# Patient Record
Sex: MALE | Race: WHITE | NOT HISPANIC OR LATINO | Employment: FULL TIME | RURAL
[De-identification: names, ages, dates, MRNs, and addresses within clinical notes are randomized per-mention and may not be internally consistent; named-entity substitution may affect disease eponyms.]

---

## 2019-08-30 ENCOUNTER — OFFICE VISIT (OUTPATIENT)
Dept: FAMILY MEDICINE CLINIC | Facility: CLINIC | Age: 49
End: 2019-08-30

## 2019-08-30 VITALS
OXYGEN SATURATION: 98 % | SYSTOLIC BLOOD PRESSURE: 143 MMHG | HEART RATE: 83 BPM | RESPIRATION RATE: 18 BRPM | WEIGHT: 251 LBS | TEMPERATURE: 98 F | DIASTOLIC BLOOD PRESSURE: 84 MMHG | BODY MASS INDEX: 34 KG/M2 | HEIGHT: 72 IN

## 2019-08-30 DIAGNOSIS — E11.9 TYPE 2 DIABETES MELLITUS WITHOUT COMPLICATION, WITHOUT LONG-TERM CURRENT USE OF INSULIN (HCC): Primary | ICD-10-CM

## 2019-08-30 DIAGNOSIS — Z13.220 SCREENING FOR HYPERLIPIDEMIA: ICD-10-CM

## 2019-08-30 DIAGNOSIS — Z13.29 SCREENING FOR THYROID DISORDER: ICD-10-CM

## 2019-08-30 DIAGNOSIS — R73.9 ELEVATED BLOOD SUGAR LEVEL: ICD-10-CM

## 2019-08-30 DIAGNOSIS — R73.9 ELEVATED BLOOD SUGAR: ICD-10-CM

## 2019-08-30 PROBLEM — R03.0 ELEVATED BLOOD PRESSURE READING: Status: ACTIVE | Noted: 2019-08-30

## 2019-08-30 LAB
BILIRUB BLD-MCNC: NEGATIVE MG/DL
CLARITY, POC: CLEAR
COLOR UR: YELLOW
GLUCOSE BLDC GLUCOMTR-MCNC: 329 MG/DL (ref 70–130)
GLUCOSE UR STRIP-MCNC: ABNORMAL MG/DL
HBA1C MFR BLD: 9 %
KETONES UR QL: NEGATIVE
LEUKOCYTE EST, POC: NEGATIVE
NITRITE UR-MCNC: NEGATIVE MG/ML
PH UR: 5 [PH] (ref 5–8)
POC MICROALBUMIN URINE: 20
PROT UR STRIP-MCNC: ABNORMAL MG/DL
RBC # UR STRIP: NEGATIVE /UL
SP GR UR: 1.03 (ref 1–1.03)
UROBILINOGEN UR QL: NORMAL

## 2019-08-30 PROCEDURE — 81003 URINALYSIS AUTO W/O SCOPE: CPT | Performed by: FAMILY MEDICINE

## 2019-08-30 PROCEDURE — 82962 GLUCOSE BLOOD TEST: CPT | Performed by: FAMILY MEDICINE

## 2019-08-30 PROCEDURE — 83036 HEMOGLOBIN GLYCOSYLATED A1C: CPT | Performed by: FAMILY MEDICINE

## 2019-08-30 PROCEDURE — 99213 OFFICE O/P EST LOW 20 MIN: CPT | Performed by: FAMILY MEDICINE

## 2019-08-30 PROCEDURE — 82044 UR ALBUMIN SEMIQUANTITATIVE: CPT | Performed by: FAMILY MEDICINE

## 2019-08-30 RX ORDER — LANCETS 33 GAUGE
EACH MISCELLANEOUS
Status: CANCELLED | OUTPATIENT
Start: 2019-08-30

## 2019-08-30 RX ORDER — GLYBURIDE 2.5 MG/1
2.5 TABLET ORAL
Qty: 30 TABLET | Refills: 12 | Status: SHIPPED | OUTPATIENT
Start: 2019-08-30 | End: 2020-12-16

## 2019-08-30 RX ORDER — BLOOD-GLUCOSE METER
EACH MISCELLANEOUS
Status: CANCELLED | OUTPATIENT
Start: 2019-08-30

## 2019-08-30 RX ORDER — LANCETS 30 GAUGE
1 EACH MISCELLANEOUS DAILY
Qty: 90 EACH | Refills: 0 | Status: SHIPPED | OUTPATIENT
Start: 2019-08-30

## 2019-08-30 RX ORDER — BLOOD-GLUCOSE METER
1 KIT MISCELLANEOUS DAILY
Qty: 1 EACH | Refills: 0 | Status: SHIPPED | OUTPATIENT
Start: 2019-08-30

## 2019-09-03 ENCOUNTER — RESULTS ENCOUNTER (OUTPATIENT)
Dept: FAMILY MEDICINE CLINIC | Facility: CLINIC | Age: 49
End: 2019-09-03

## 2019-09-03 DIAGNOSIS — Z13.29 SCREENING FOR THYROID DISORDER: ICD-10-CM

## 2019-09-03 DIAGNOSIS — E11.9 TYPE 2 DIABETES MELLITUS WITHOUT COMPLICATION, WITHOUT LONG-TERM CURRENT USE OF INSULIN (HCC): ICD-10-CM

## 2019-09-03 DIAGNOSIS — Z13.220 SCREENING FOR HYPERLIPIDEMIA: ICD-10-CM

## 2019-09-06 ENCOUNTER — TELEPHONE (OUTPATIENT)
Dept: FAMILY MEDICINE CLINIC | Facility: CLINIC | Age: 49
End: 2019-09-06

## 2019-09-07 LAB
ALBUMIN SERPL-MCNC: 4.9 G/DL (ref 3.5–5.5)
ALBUMIN/GLOB SERPL: 2.3 {RATIO} (ref 1.2–2.2)
ALP SERPL-CCNC: 75 IU/L (ref 39–117)
ALT SERPL-CCNC: 18 IU/L (ref 0–44)
AST SERPL-CCNC: 17 IU/L (ref 0–40)
BASOPHILS # BLD AUTO: 0 X10E3/UL (ref 0–0.2)
BASOPHILS NFR BLD AUTO: 0 %
BILIRUB SERPL-MCNC: 0.4 MG/DL (ref 0–1.2)
BUN SERPL-MCNC: 22 MG/DL (ref 6–24)
BUN/CREAT SERPL: 24 (ref 9–20)
CALCIUM SERPL-MCNC: 9.7 MG/DL (ref 8.7–10.2)
CHLORIDE SERPL-SCNC: 103 MMOL/L (ref 96–106)
CHOLEST SERPL-MCNC: 245 MG/DL (ref 100–199)
CHOLEST/HDLC SERPL: 6.8 RATIO (ref 0–5)
CO2 SERPL-SCNC: 23 MMOL/L (ref 20–29)
CREAT SERPL-MCNC: 0.92 MG/DL (ref 0.76–1.27)
EOSINOPHIL # BLD AUTO: 0.1 X10E3/UL (ref 0–0.4)
EOSINOPHIL NFR BLD AUTO: 1 %
ERYTHROCYTE [DISTWIDTH] IN BLOOD BY AUTOMATED COUNT: 12.8 % (ref 12.3–15.4)
GLOBULIN SER CALC-MCNC: 2.1 G/DL (ref 1.5–4.5)
GLUCOSE SERPL-MCNC: 132 MG/DL (ref 65–99)
HCT VFR BLD AUTO: 45.8 % (ref 37.5–51)
HDLC SERPL-MCNC: 36 MG/DL
HGB BLD-MCNC: 15.6 G/DL (ref 13–17.7)
IMM GRANULOCYTES # BLD AUTO: 0 X10E3/UL (ref 0–0.1)
IMM GRANULOCYTES NFR BLD AUTO: 0 %
LDLC SERPL CALC-MCNC: 182 MG/DL (ref 0–99)
LYMPHOCYTES # BLD AUTO: 2.5 X10E3/UL (ref 0.7–3.1)
LYMPHOCYTES NFR BLD AUTO: 47 %
MCH RBC QN AUTO: 30.1 PG (ref 26.6–33)
MCHC RBC AUTO-ENTMCNC: 34.1 G/DL (ref 31.5–35.7)
MCV RBC AUTO: 88 FL (ref 79–97)
MONOCYTES # BLD AUTO: 0.5 X10E3/UL (ref 0.1–0.9)
MONOCYTES NFR BLD AUTO: 8 %
NEUTROPHILS # BLD AUTO: 2.4 X10E3/UL (ref 1.4–7)
NEUTROPHILS NFR BLD AUTO: 44 %
PLATELET # BLD AUTO: 262 X10E3/UL (ref 150–450)
POTASSIUM SERPL-SCNC: 4.9 MMOL/L (ref 3.5–5.2)
PROT SERPL-MCNC: 7 G/DL (ref 6–8.5)
RBC # BLD AUTO: 5.18 X10E6/UL (ref 4.14–5.8)
SODIUM SERPL-SCNC: 141 MMOL/L (ref 134–144)
TRIGL SERPL-MCNC: 133 MG/DL (ref 0–149)
TSH SERPL DL<=0.005 MIU/L-ACNC: 1.2 UIU/ML (ref 0.45–4.5)
VLDLC SERPL CALC-MCNC: 27 MG/DL (ref 5–40)
WBC # BLD AUTO: 5.4 X10E3/UL (ref 3.4–10.8)

## 2019-12-02 ENCOUNTER — OFFICE VISIT (OUTPATIENT)
Dept: FAMILY MEDICINE CLINIC | Facility: CLINIC | Age: 49
End: 2019-12-02

## 2019-12-02 VITALS
TEMPERATURE: 98 F | OXYGEN SATURATION: 98 % | DIASTOLIC BLOOD PRESSURE: 79 MMHG | WEIGHT: 237 LBS | HEART RATE: 94 BPM | SYSTOLIC BLOOD PRESSURE: 117 MMHG | HEIGHT: 72 IN | RESPIRATION RATE: 18 BRPM | BODY MASS INDEX: 32.1 KG/M2

## 2019-12-02 DIAGNOSIS — E11.9 TYPE 2 DIABETES MELLITUS WITHOUT COMPLICATION, WITHOUT LONG-TERM CURRENT USE OF INSULIN (HCC): Primary | ICD-10-CM

## 2019-12-02 LAB — HBA1C MFR BLD: 5.4 %

## 2019-12-02 PROCEDURE — 99213 OFFICE O/P EST LOW 20 MIN: CPT | Performed by: FAMILY MEDICINE

## 2019-12-02 PROCEDURE — 83036 HEMOGLOBIN GLYCOSYLATED A1C: CPT | Performed by: FAMILY MEDICINE

## 2019-12-02 NOTE — PROGRESS NOTES
"No chief complaint on file.      History of Present Illness:  Subjective   Devendra Ramírez is a 49 y.o. male.   History of Present Illness     Subjective:      Devendra Ramírez is a 49 y.o. male who presents for follow-up of Type 2 diabetes mellitus.  Current symptoms/problems include none and have been improving. Symptoms have been present for 6 months.    Known diabetic complications: none  Cardiovascular risk factors: diabetes mellitus, male gender, obesity (BMI >= 30 kg/m2) and sedentary lifestyle  Current diabetic medications include oral agents (dual therapy): Metformin & Glyburide and insulin injections: Ozempic.   Eye exam current (within one year): yes  Weight trend: decreasing steadily  Prior visit with dietician: no  Current diet: in general, a \"healthy\" diet  , well balanced  Current exercise: cardiovascular workout on exercise equipment and weightlifting    Current monitoring regimen: home blood tests - 3 times weekly  Home blood sugar records: fasting range: < 120  Any episodes of hypoglycemia? no    Is He on ACE inhibitor or angiotensin II receptor blocker?   No   none  none    The following portions of the patient's history were reviewed and updated as appropriate: allergies, current medications, past family history, past medical history, past social history, past surgical history and problem list.    Allergies:  No Known Allergies    Social History:  Social History     Socioeconomic History   • Marital status: Single     Spouse name: Not on file   • Number of children: Not on file   • Years of education: Not on file   • Highest education level: Not on file   Tobacco Use   • Smoking status: Never Smoker   • Smokeless tobacco: Current User     Types: Chew   Substance and Sexual Activity   • Alcohol use: Yes     Frequency: Monthly or less     Comment: 3 drinks a month   • Drug use: No       Family History:  Family History   Problem Relation Age of Onset   • Diabetes Father        Past Medical History " ":  Active Ambulatory Problems     Diagnosis Date Noted   • Type 2 diabetes mellitus without complication, without long-term current use of insulin (CMS/[facility]) 08/30/2019   • Elevated blood pressure reading 08/30/2019     Resolved Ambulatory Problems     Diagnosis Date Noted   • No Resolved Ambulatory Problems     Past Medical History:   Diagnosis Date   • Acute maxillary sinusitis    • Blurry vision, bilateral    • Cheilitis    • Fever and chills    • Folliculitis barbae    • Numbness, limb    • Overweight (BMI 25.0-29.9)    • Recurrent cold sores    • Screening for diabetes mellitus    • Screening for hyperlipidemia    • Screening for hypothyroidism    • Smokeless tobacco use          Current Outpatient Medications:   •  glucose blood test strip, Use as instructed, Disp: 90 each, Rfl: 0  •  glucose monitor monitoring kit, 1 each Daily. Use to check blood sugar 1 time a day, Disp: 1 each, Rfl: 0  •  glyBURIDE (DIAbeta) 2.5 MG tablet, Take 1 tablet by mouth Daily With Breakfast., Disp: 30 tablet, Rfl: 12  •  Lancets misc, 1 Device Daily., Disp: 90 each, Rfl: 0  •  metFORMIN (GLUCOPHAGE) 500 MG tablet, Take 1 tablet by mouth 2 (Two) Times a Day With Meals., Disp: 60 tablet, Rfl: 12  •  Semaglutide (OZEMPIC) 0.25 or 0.5 MG/DOSE solution pen-injector, Inject 0.5 mg under the skin into the appropriate area as directed 1 (One) Time Per Week., Disp: 1 pen, Rfl: 12    Review of Systems  Pertinent items are noted in HPI.      Objective:      Resp 18   Ht 182.9 cm (72\")   Wt 108 kg (237 lb)   BMI 32.14 kg/m²     General:  alert, appears stated age, cooperative and no distress   Oropharynx: lips, mucosa, and tongue normal; teeth and gums normal    Eyes:  conjunctivae/corneas clear. PERRL, EOM's intact. Fundi benign.    Ears:  normal TM's and external ear canals both ears   Neck: no adenopathy, no carotid bruit, no JVD, supple, symmetrical, trachea midline and thyroid not enlarged, symmetric, no tenderness/mass/nodules "   Thyroid:  no palpable nodule   Lung: clear to auscultation bilaterally   Heart:  regular rate and rhythm, S1, S2 normal, no murmur, click, rub or gallop   Abdomen: soft, non-tender; bowel sounds normal; no masses,  no organomegaly   Extremities: extremities normal, atraumatic, no cyanosis or edema and NA   Skin: warm and dry, no hyperpigmentation, vitiligo, or suspicious lesions   Pulses: 2+ and symmetric   Neuro: normal without focal findings, mental status, speech normal, alert and oriented x3 and FRANCISCO JAVIER     Lab Review  Total CO2 (mmol/L)   Date Value   09/06/2019 23     BUN (mg/dL)   Date Value   09/06/2019 22     Creatinine (mg/dL)   Date Value   09/06/2019 0.92     A1c is 5.4      Assessment:      Diabetes Mellitus type II, under excellent control.      Plan:      1.  Rx changes: none  2.  Education: Reviewed ‘ABCs’ of diabetes management (respective goals in parentheses):  A1C (<7), blood pressure (<130/80), and cholesterol (LDL <100).  3.  Compliance at present is estimated to be excellent. Efforts to improve compliance (if necessary) will be directed at regular blood sugar monitoring: quarterly.  4. Follow up: 3 months   Allergies:  No Known Allergies    Social History:  Social History     Socioeconomic History   • Marital status: Single     Spouse name: Not on file   • Number of children: Not on file   • Years of education: Not on file   • Highest education level: Not on file   Tobacco Use   • Smoking status: Never Smoker   • Smokeless tobacco: Current User     Types: Chew   Substance and Sexual Activity   • Alcohol use: Yes     Frequency: Monthly or less     Comment: 3 drinks a month   • Drug use: No       Family History:  Family History   Problem Relation Age of Onset   • Diabetes Father        Past Medical History :  Active Ambulatory Problems     Diagnosis Date Noted   • Type 2 diabetes mellitus without complication, without long-term current use of insulin (CMS/[facility]) 08/30/2019   • Elevated blood  pressure reading 08/30/2019     Resolved Ambulatory Problems     Diagnosis Date Noted   • No Resolved Ambulatory Problems     Past Medical History:   Diagnosis Date   • Acute maxillary sinusitis    • Blurry vision, bilateral    • Cheilitis    • Fever and chills    • Folliculitis barbae    • Numbness, limb    • Overweight (BMI 25.0-29.9)    • Recurrent cold sores    • Screening for diabetes mellitus    • Screening for hyperlipidemia    • Screening for hypothyroidism    • Smokeless tobacco use        Medication List:    Current Outpatient Medications:   •  glucose blood test strip, Use as instructed, Disp: 90 each, Rfl: 0  •  glucose monitor monitoring kit, 1 each Daily. Use to check blood sugar 1 time a day, Disp: 1 each, Rfl: 0  •  glyBURIDE (DIAbeta) 2.5 MG tablet, Take 1 tablet by mouth Daily With Breakfast., Disp: 30 tablet, Rfl: 12  •  Lancets misc, 1 Device Daily., Disp: 90 each, Rfl: 0  •  metFORMIN (GLUCOPHAGE) 500 MG tablet, Take 1 tablet by mouth 2 (Two) Times a Day With Meals., Disp: 60 tablet, Rfl: 12  •  Semaglutide (OZEMPIC) 0.25 or 0.5 MG/DOSE solution pen-injector, Inject 0.5 mg under the skin into the appropriate area as directed 1 (One) Time Per Week., Disp: 1 pen, Rfl: 12    Past Surgical History:  No past surgical history on file.     The following portions of the patient's history were reviewed and updated as appropriate: allergies, current medications, past family history, past medical history, past social history, past surgical history and problem list.    Review Of Systems:  Review of Systems    Physical Exam:  Vital Signs:  There were no vitals filed for this visit.  There is no height or weight on file to calculate BMI.    Physical Exam      Assessment and Plan:  There are no diagnoses linked to this encounter.

## 2019-12-28 NOTE — ASSESSMENT & PLAN NOTE
Diabetes is improving with treatment.   Continue current treatment regimen.  Reminded to bring in blood sugar diary at next visit.  Dietary recommendations for ADA diet.  Regular aerobic exercise.  Discussed ways to avoid symptomatic hypoglycemia.  Discussed sick day management.  Diabetes will be reassessed in 3 months.  His A1C is 5.4

## 2020-03-27 PROBLEM — F17.200 TOBACCO USE DISORDER: Status: ACTIVE | Noted: 2020-03-27

## 2020-03-27 PROBLEM — E66.3 OVERWEIGHT: Status: ACTIVE | Noted: 2020-03-27

## 2020-03-27 PROBLEM — H53.8 BLURRY VISION, BILATERAL: Status: ACTIVE | Noted: 2020-03-27

## 2020-03-27 PROBLEM — K13.0 CHEILITIS: Status: ACTIVE | Noted: 2020-03-27

## 2020-10-26 PROCEDURE — U0003 INFECTIOUS AGENT DETECTION BY NUCLEIC ACID (DNA OR RNA); SEVERE ACUTE RESPIRATORY SYNDROME CORONAVIRUS 2 (SARS-COV-2) (CORONAVIRUS DISEASE [COVID-19]), AMPLIFIED PROBE TECHNIQUE, MAKING USE OF HIGH THROUGHPUT TECHNOLOGIES AS DESCRIBED BY CMS-2020-01-R: HCPCS | Performed by: FAMILY MEDICINE

## 2020-10-27 ENCOUNTER — TELEPHONE (OUTPATIENT)
Dept: URGENT CARE | Facility: CLINIC | Age: 50
End: 2020-10-27

## 2020-10-27 NOTE — TELEPHONE ENCOUNTER
Call this patient and told him his coronavirus test was positive.  Told him that he would need to stay on home quarantine for 10 days from the day his symptoms started.  He was instructed to stay 6 feet away from others in his home.  He was instructed to wear a mask if he is in the same room with others even if he is 6 feet away.  He is to keep his hands washed and away from his face.  Told to go to the emergency room if he develops respiratory distress.

## 2020-12-16 ENCOUNTER — OFFICE VISIT (OUTPATIENT)
Dept: FAMILY MEDICINE CLINIC | Facility: CLINIC | Age: 50
End: 2020-12-16

## 2020-12-16 VITALS
HEIGHT: 71 IN | WEIGHT: 243.6 LBS | SYSTOLIC BLOOD PRESSURE: 144 MMHG | BODY MASS INDEX: 34.1 KG/M2 | HEART RATE: 86 BPM | TEMPERATURE: 98.2 F | RESPIRATION RATE: 18 BRPM | OXYGEN SATURATION: 97 % | DIASTOLIC BLOOD PRESSURE: 85 MMHG

## 2020-12-16 DIAGNOSIS — Z00.00 PHYSICAL EXAM: Primary | ICD-10-CM

## 2020-12-16 DIAGNOSIS — E11.9 TYPE 2 DIABETES MELLITUS WITHOUT COMPLICATION, WITHOUT LONG-TERM CURRENT USE OF INSULIN (HCC): ICD-10-CM

## 2020-12-16 DIAGNOSIS — I10 ESSENTIAL HYPERTENSION: ICD-10-CM

## 2020-12-16 DIAGNOSIS — N52.9 ERECTILE DYSFUNCTION, UNSPECIFIED ERECTILE DYSFUNCTION TYPE: ICD-10-CM

## 2020-12-16 DIAGNOSIS — E78.2 MIXED HYPERLIPIDEMIA: ICD-10-CM

## 2020-12-16 LAB
GLUCOSE BLDC GLUCOMTR-MCNC: 175 MG/DL (ref 70–130)
HBA1C MFR BLD: 8.3 %

## 2020-12-16 PROCEDURE — 99214 OFFICE O/P EST MOD 30 MIN: CPT | Performed by: FAMILY MEDICINE

## 2020-12-16 PROCEDURE — 83036 HEMOGLOBIN GLYCOSYLATED A1C: CPT | Performed by: FAMILY MEDICINE

## 2020-12-16 PROCEDURE — 99396 PREV VISIT EST AGE 40-64: CPT | Performed by: FAMILY MEDICINE

## 2020-12-16 PROCEDURE — 82962 GLUCOSE BLOOD TEST: CPT | Performed by: FAMILY MEDICINE

## 2020-12-16 RX ORDER — GLYBURIDE 5 MG/1
5 TABLET ORAL 2 TIMES DAILY WITH MEALS
Qty: 180 TABLET | Refills: 3 | Status: SHIPPED | OUTPATIENT
Start: 2020-12-16 | End: 2021-07-08 | Stop reason: SDUPTHER

## 2020-12-16 RX ORDER — SILDENAFIL CITRATE 20 MG/1
20 TABLET ORAL 3 TIMES DAILY
Qty: 90 TABLET | Refills: 2 | Status: SHIPPED | OUTPATIENT
Start: 2020-12-16 | End: 2022-01-14 | Stop reason: SDUPTHER

## 2020-12-16 RX ORDER — LISINOPRIL 20 MG/1
20 TABLET ORAL DAILY
Qty: 90 TABLET | Refills: 3 | Status: SHIPPED | OUTPATIENT
Start: 2020-12-16 | End: 2022-01-14

## 2020-12-16 RX ORDER — PRAVASTATIN SODIUM 40 MG
40 TABLET ORAL DAILY
Qty: 30 TABLET | Refills: 12 | Status: SHIPPED | OUTPATIENT
Start: 2020-12-16 | End: 2022-01-14 | Stop reason: SDUPTHER

## 2020-12-16 NOTE — ASSESSMENT & PLAN NOTE
Hypertension is worsening.  Continue current treatment regimen.  Dietary sodium restriction.  Weight loss.  Regular aerobic exercise.  Blood pressure will be reassessed in 4 weeks.

## 2020-12-16 NOTE — PROGRESS NOTES
Chief Complaint   Patient presents with   • Diabetes   • Annual Exam       History of Present Illness:  Subjective   Devendra Ramírez is a 50 y.o. male.   12/16/2020- The patient is here today and he states that since him getting the virus it in October he has hit he states that he has had a hard time getting hard and having an erection. He states that this was never a concern for him prior to the virus and he states that now he has a issues with it and he is concerned as to what is causing this.     Diabetes  He presents for his follow-up diabetic visit. He has type 2 diabetes mellitus. Hypoglycemia symptoms include nervousness/anxiousness (Due to stress of virus and work is harder ). Pertinent negatives for hypoglycemia include no confusion, dizziness, headaches, hunger, mood changes, pallor, seizures, sleepiness, speech difficulty, sweats or tremors. Associated symptoms include blurred vision (He states that he has reading glasses now ). Pertinent negatives for diabetes include no chest pain, no fatigue, no foot paresthesias, no foot ulcerations, no polydipsia, no polyphagia, no polyuria, no visual change, no weakness and no weight loss. There are no hypoglycemic complications. Pertinent negatives for hypoglycemia complications include no blackouts, no hospitalization, no nocturnal hypoglycemia, no required assistance and no required glucagon injection. Symptoms are stable. There are no diabetic complications. Pertinent negatives for diabetic complications include no autonomic neuropathy, CVA, heart disease, impotence, nephropathy, peripheral neuropathy, PVD or retinopathy. Risk factors for coronary artery disease include family history, diabetes mellitus, obesity, male sex, hypertension and stress. Current diabetic treatment includes oral agent (monotherapy) (he is one metformin to treat ). His weight is fluctuating minimally. He is following a diabetic (portion control as well and calories and intake ) diet.  When asked about meal planning, he reported none. He has not had a previous visit with a dietitian. He participates in exercise intermittently. (Patient states that he checks his sugars twice a day at home and he states that he is averaging 120. He states that depending on what he eats this could change but he states that overall he stays pretty consistent.   ) An ACE inhibitor/angiotensin II receptor blocker is not being taken. He does not see a podiatrist.Eye exam is current.      Devendra Ramírez is a 50 y.o. male here for his annual physical with me. Devendra is here for coordination of medical care, to discuss health maintenance, disease prevention as well as to followup on medical problems. Patient has been followed by me for some time now . Patient's last CPE was unknown. Activity level is moderate. Exercises 4 per week. Appetite is good. Feels well with few complaints. Energy level is good. Sleeps well. Patient is doing routine self skin exam monthly.       Allergies:  No Known Allergies    Social History:  Social History     Socioeconomic History   • Marital status: Single     Spouse name: Not on file   • Number of children: Not on file   • Years of education: Not on file   • Highest education level: Not on file   Tobacco Use   • Smoking status: Never Smoker   • Smokeless tobacco: Current User     Types: Chew   Substance and Sexual Activity   • Alcohol use: Yes     Frequency: Monthly or less     Comment: 3 drinks a month   • Drug use: No       Family History:  Family History   Problem Relation Age of Onset   • Diabetes Father        Past Medical History :  Active Ambulatory Problems     Diagnosis Date Noted   • Type 2 diabetes mellitus without complication, without long-term current use of insulin (CMS/[facility]) 08/30/2019   • Elevated blood pressure reading 08/30/2019   • Blurry vision, bilateral 03/27/2020   • Cheilitis 03/27/2020   • Overweight 03/27/2020   • Tobacco use disorder 03/27/2020   • Physical exam  12/16/2020   • Essential hypertension 12/16/2020   • Mixed hyperlipidemia 12/16/2020   • Erectile dysfunction 12/16/2020     Resolved Ambulatory Problems     Diagnosis Date Noted   • No Resolved Ambulatory Problems     Past Medical History:   Diagnosis Date   • Acute maxillary sinusitis    • Fever and chills    • Folliculitis barbae    • Numbness, limb    • Overweight (BMI 25.0-29.9)    • Recurrent cold sores    • Screening for diabetes mellitus    • Screening for hyperlipidemia    • Screening for hypothyroidism    • Smokeless tobacco use        Medication List:  Outpatient Encounter Medications as of 12/16/2020   Medication Sig Dispense Refill   • glucose blood test strip Use as instructed 90 each 0   • glucose monitor monitoring kit 1 each Daily. Use to check blood sugar 1 time a day 1 each 0   • Lancets misc 1 Device Daily. 90 each 0   • metFORMIN (GLUCOPHAGE) 500 MG tablet Take 1 tablet by mouth 2 (Two) Times a Day With Meals. 60 tablet 12   • [DISCONTINUED] benzonatate (TESSALON) 200 MG capsule 1 capsule p.o. every 8 hours as needed cough 21 capsule 0   • glyburide (DIAbeta) 5 MG tablet Take 1 tablet by mouth 2 (Two) Times a Day With Meals. 180 tablet 3   • lisinopril (PRINIVIL,ZESTRIL) 20 MG tablet Take 1 tablet by mouth Daily. 90 tablet 3   • pravastatin (PRAVACHOL) 40 MG tablet Take 1 tablet by mouth Daily. 30 tablet 12   • sildenafil (REVATIO) 20 MG tablet Take 1 tablet by mouth 3 (Three) Times a Day. 90 tablet 2   • [DISCONTINUED] glyBURIDE (DIAbeta) 2.5 MG tablet Take 1 tablet by mouth Daily With Breakfast. 30 tablet 12   • [DISCONTINUED] Semaglutide (OZEMPIC) 0.25 or 0.5 MG/DOSE solution pen-injector Inject 0.5 mg under the skin into the appropriate area as directed 1 (One) Time Per Week. 1 pen 12     No facility-administered encounter medications on file as of 12/16/2020.        Past Surgical History:  History reviewed. No pertinent surgical history.     The following portions of the patient's history  "were reviewed and updated as appropriate: allergies, current medications, past family history, past medical history, past social history, past surgical history and problem list.    Review Of Systems:  Review of Systems   Constitutional: Negative for activity change, appetite change, fatigue and unexpected weight loss.   HENT: Negative for congestion, postnasal drip, sinus pressure and sore throat.    Eyes: Positive for blurred vision (He states that he has reading glasses now ). Negative for itching.   Respiratory: Negative for cough, shortness of breath and wheezing.    Cardiovascular: Negative for chest pain.   Gastrointestinal: Negative for abdominal pain, constipation, nausea, vomiting and GERD.   Endocrine: Negative for cold intolerance, heat intolerance, polydipsia, polyphagia and polyuria.   Genitourinary: Negative for difficulty urinating, dysuria, impotence and urinary incontinence.   Musculoskeletal: Negative for back pain, joint swelling and neck pain.   Skin: Negative for color change, pallor and rash.   Neurological: Negative for dizziness, tremors, seizures, speech difficulty, weakness, memory problem and confusion.   Psychiatric/Behavioral: Negative for behavioral problems, decreased concentration, suicidal ideas and depressed mood. The patient is nervous/anxious (Due to stress of virus and work is harder ).        Objective     Physical Exam:  Vital Signs:  Visit Vitals  /85   Pulse 86   Temp 98.2 °F (36.8 °C)   Resp 18   Ht 180.3 cm (71\")   Wt 110 kg (243 lb 9.6 oz)   SpO2 97%   BMI 33.98 kg/m²       Physical Exam  Vitals signs reviewed.   Constitutional:       Appearance: He is well-developed.   HENT:      Head: Normocephalic and atraumatic.      Nose: Nose normal.   Eyes:      General: Lids are normal.      Conjunctiva/sclera: Conjunctivae normal.      Pupils: Pupils are equal, round, and reactive to light.   Neck:      Musculoskeletal: Normal range of motion and neck supple. "   Cardiovascular:      Rate and Rhythm: Normal rate and regular rhythm.      Pulses: Normal pulses.      Heart sounds: Normal heart sounds.   Pulmonary:      Effort: Pulmonary effort is normal. No respiratory distress.      Breath sounds: Normal breath sounds.   Abdominal:      General: Bowel sounds are normal.      Palpations: Abdomen is soft.   Musculoskeletal: Normal range of motion.   Skin:     General: Skin is warm and dry.      Capillary Refill: Capillary refill takes less than 2 seconds.   Neurological:      Mental Status: He is alert and oriented to person, place, and time.   Psychiatric:         Behavior: Behavior normal.         Thought Content: Thought content normal.         Judgment: Judgment normal.           Assessment/Plan   Assessment and Plan:  Diagnoses and all orders for this visit:    1. Physical exam (Primary)  Assessment & Plan:  Discussed injury prevention, diet and exercise, safe sexual practices, and screening for common diseases. Encouraged use of sunscreen and seatbelts. Avoidance of tobacco encouraged. Limitation or avoidance of alcohol encouraged. Recommend yearly dental and eye exams. Also discussed monitoring of blood pressure, lipids.      2. Type 2 diabetes mellitus without complication, without long-term current use of insulin (CMS/[facility])  -     POC Glycosylated Hemoglobin (Hb A1C)  -     POCT Glucose  -     glyburide (DIAbeta) 5 MG tablet; Take 1 tablet by mouth 2 (Two) Times a Day With Meals.  Dispense: 180 tablet; Refill: 3    3. Essential hypertension  Assessment & Plan:  Hypertension is worsening.  Continue current treatment regimen.  Dietary sodium restriction.  Weight loss.  Regular aerobic exercise.  Blood pressure will be reassessed in 4 weeks.    Orders:  -     lisinopril (PRINIVIL,ZESTRIL) 20 MG tablet; Take 1 tablet by mouth Daily.  Dispense: 90 tablet; Refill: 3    4. Mixed hyperlipidemia  Assessment & Plan:  Lipid abnormalities are worsening.  Nutritional counseling was  provided. and Pharmacotherapy as ordered.  Lipids will be reassessed in 6 months.    Orders:  -     pravastatin (PRAVACHOL) 40 MG tablet; Take 1 tablet by mouth Daily.  Dispense: 30 tablet; Refill: 12    5. Erectile dysfunction, unspecified erectile dysfunction type  Assessment & Plan:  Pt was started on Sildenafil to treat his symptoms.     Orders:  -     sildenafil (REVATIO) 20 MG tablet; Take 1 tablet by mouth 3 (Three) Times a Day.  Dispense: 90 tablet; Refill: 2

## 2020-12-23 ENCOUNTER — TELEPHONE (OUTPATIENT)
Dept: FAMILY MEDICINE CLINIC | Facility: CLINIC | Age: 50
End: 2020-12-23

## 2020-12-23 DIAGNOSIS — E11.9 TYPE 2 DIABETES MELLITUS WITHOUT COMPLICATION, WITHOUT LONG-TERM CURRENT USE OF INSULIN (HCC): ICD-10-CM

## 2020-12-23 NOTE — TELEPHONE ENCOUNTER
One month rx sent to [facility] at 6:22 pm.  He will need to contact Dr. Chung for additional refills.

## 2020-12-23 NOTE — TELEPHONE ENCOUNTER
He is needing a refill on metformin 500 mg to be sent to [facility] in [place]. He only has 3 pills left.

## 2021-01-16 DIAGNOSIS — E11.9 TYPE 2 DIABETES MELLITUS WITHOUT COMPLICATION, WITHOUT LONG-TERM CURRENT USE OF INSULIN (HCC): ICD-10-CM

## 2021-02-17 DIAGNOSIS — E11.9 TYPE 2 DIABETES MELLITUS WITHOUT COMPLICATION, WITHOUT LONG-TERM CURRENT USE OF INSULIN (HCC): ICD-10-CM

## 2021-03-11 DIAGNOSIS — E11.9 TYPE 2 DIABETES MELLITUS WITHOUT COMPLICATION, WITHOUT LONG-TERM CURRENT USE OF INSULIN (HCC): ICD-10-CM

## 2021-03-11 RX ORDER — BLOOD SUGAR DIAGNOSTIC
STRIP MISCELLANEOUS
Qty: 100 EACH | Refills: 5 | Status: SHIPPED | OUTPATIENT
Start: 2021-03-11

## 2021-03-15 DIAGNOSIS — E11.9 TYPE 2 DIABETES MELLITUS WITHOUT COMPLICATION, WITHOUT LONG-TERM CURRENT USE OF INSULIN (HCC): ICD-10-CM

## 2021-03-19 ENCOUNTER — OFFICE VISIT (OUTPATIENT)
Dept: FAMILY MEDICINE CLINIC | Facility: CLINIC | Age: 51
End: 2021-03-19

## 2021-03-19 VITALS
RESPIRATION RATE: 18 BRPM | HEART RATE: 114 BPM | BODY MASS INDEX: 37.3 KG/M2 | DIASTOLIC BLOOD PRESSURE: 82 MMHG | WEIGHT: 266.4 LBS | HEIGHT: 71 IN | SYSTOLIC BLOOD PRESSURE: 114 MMHG | OXYGEN SATURATION: 95 % | TEMPERATURE: 97.1 F

## 2021-03-19 DIAGNOSIS — R10.11 RIGHT UPPER QUADRANT ABDOMINAL PAIN: ICD-10-CM

## 2021-03-19 DIAGNOSIS — E11.9 TYPE 2 DIABETES MELLITUS WITHOUT COMPLICATION, WITHOUT LONG-TERM CURRENT USE OF INSULIN (HCC): Primary | ICD-10-CM

## 2021-03-19 DIAGNOSIS — E66.01 MORBIDLY OBESE (HCC): ICD-10-CM

## 2021-03-19 DIAGNOSIS — J01.00 ACUTE NON-RECURRENT MAXILLARY SINUSITIS: ICD-10-CM

## 2021-03-19 LAB
GLUCOSE BLDC GLUCOMTR-MCNC: 153 MG/DL (ref 70–130)
HBA1C MFR BLD: 7.1 %

## 2021-03-19 PROCEDURE — 99214 OFFICE O/P EST MOD 30 MIN: CPT | Performed by: FAMILY MEDICINE

## 2021-03-19 PROCEDURE — 83036 HEMOGLOBIN GLYCOSYLATED A1C: CPT | Performed by: FAMILY MEDICINE

## 2021-03-19 PROCEDURE — 82962 GLUCOSE BLOOD TEST: CPT | Performed by: FAMILY MEDICINE

## 2021-03-19 RX ORDER — CIPROFLOXACIN 500 MG/1
500 TABLET, FILM COATED ORAL 2 TIMES DAILY
Qty: 20 TABLET | Refills: 0 | Status: SHIPPED | OUTPATIENT
Start: 2021-03-19 | End: 2021-05-14

## 2021-03-19 RX ORDER — DICLOFENAC SODIUM 75 MG/1
75 TABLET, DELAYED RELEASE ORAL 2 TIMES DAILY
COMMUNITY
Start: 2021-02-19 | End: 2022-01-14

## 2021-03-19 NOTE — ASSESSMENT & PLAN NOTE
Unknown etiology of symptoms.  This occurs around the areas of his lipomas.  Patient was advised to use heat on the areas and monitor the symptoms if they get worse.

## 2021-03-19 NOTE — ASSESSMENT & PLAN NOTE
he was prescribed Cipro to treat his symptoms.    Increase fluids. Tylenol/motrin for pain or fever.   Medication and medication adverse effects discussed.    Follow-up 5-7 days for reevaluation if not improved or sooner if needed.

## 2021-03-19 NOTE — PROGRESS NOTES
Chief Complaint   Patient presents with   • Diabetes   • Cough       History of Present Illness:  Subjective   Devendra Ramírez is a 50 y.o. male.   Abdominal pain:  He reports that he has been seen at [facility] for new onset abdominal pain.  He reports that this pain starts at night when he leaves work.  He reports feeling that the cysts are causing the pain and makes the areas sensitive.  He feels a stabbing pain.  He felt like it was his gallbladder causing the pain. He has not had symptoms after eating a fatting meal.  He reports as soon as he starts driving he has symptoms.     Diabetes  He presents for his follow-up diabetic visit. He has type 2 diabetes mellitus. No MedicAlert identification noted. His disease course has been stable. Pertinent negatives for hypoglycemia include no confusion or dizziness. (Nausea) There are no diabetic associated symptoms. Pertinent negatives for diabetes include no chest pain and no fatigue. There are no hypoglycemic complications. Symptoms are stable. There are no diabetic complications. Risk factors for coronary artery disease include diabetes mellitus, hypertension, male sex and obesity. Current diabetic treatment includes oral agent (dual therapy). He is compliant with treatment all of the time. His weight is stable. He is following a generally healthy diet. When asked about meal planning, he reported none. He has not had a previous visit with a dietitian. He never participates in exercise. His home blood glucose trend is fluctuating minimally. His breakfast blood glucose is taken between 5-6 am. His breakfast blood glucose range is generally 110-130 mg/dl. His dinner blood glucose is taken between 4-5 pm. His dinner blood glucose range is generally  mg/dl. An ACE inhibitor/angiotensin II receptor blocker is being taken. He does not see a podiatrist.Eye exam is current.   Cough  This is a new problem. The current episode started more than 1 month ago.  The problem has been unchanged. The cough is non-productive. Associated symptoms include postnasal drip. Pertinent negatives include no chest pain, ear pain, nasal congestion, rash, rhinorrhea or shortness of breath. Associated symptoms comments: Throat itching  . Nothing aggravates the symptoms. He has tried OTC cough suppressant (cough syrup, benadryl) for the symptoms. The treatment provided mild relief.        Allergies:  No Known Allergies    Social History:  Social History     Socioeconomic History   • Marital status: Single     Spouse name: Not on file   • Number of children: Not on file   • Years of education: Not on file   • Highest education level: Not on file   Tobacco Use   • Smoking status: Never Smoker   • Smokeless tobacco: Current User     Types: Chew   Substance and Sexual Activity   • Alcohol use: Yes     Comment: 3 drinks a month   • Drug use: No       Family History:  Family History   Problem Relation Age of Onset   • Diabetes Father        Past Medical History :  Active Ambulatory Problems     Diagnosis Date Noted   • Type 2 diabetes mellitus without complication, without long-term current use of insulin (CMS/HCC) 08/30/2019   • Elevated blood pressure reading 08/30/2019   • Blurry vision, bilateral 03/27/2020   • Cheilitis 03/27/2020   • Overweight 03/27/2020   • Tobacco use disorder 03/27/2020   • Physical exam 12/16/2020   • Essential hypertension 12/16/2020   • Mixed hyperlipidemia 12/16/2020   • Erectile dysfunction 12/16/2020   • Morbidly obese (CMS/[facility]) 03/19/2021   • Acute non-recurrent maxillary sinusitis 03/19/2021   • Right upper quadrant abdominal pain 03/19/2021     Resolved Ambulatory Problems     Diagnosis Date Noted   • No Resolved Ambulatory Problems     Past Medical History:   Diagnosis Date   • Acute maxillary sinusitis    • Fever and chills    • Folliculitis barbae    • Numbness, limb    • Overweight (BMI 25.0-29.9)    • Recurrent cold sores    • Screening for diabetes  mellitus    • Screening for hyperlipidemia    • Screening for hypothyroidism    • Smokeless tobacco use        Medication List:  Outpatient Encounter Medications as of 3/19/2021   Medication Sig Dispense Refill   • diclofenac (VOLTAREN) 75 MG EC tablet Take 75 mg by mouth 2 (Two) Times a Day.     • glucose monitor monitoring kit 1 each Daily. Use to check blood sugar 1 time a day 1 each 0   • glyburide (DIAbeta) 5 MG tablet Take 1 tablet by mouth 2 (Two) Times a Day With Meals. 180 tablet 3   • Lancets misc 1 Device Daily. 90 each 0   • lisinopril (PRINIVIL,ZESTRIL) 20 MG tablet Take 1 tablet by mouth Daily. 90 tablet 3   • metFORMIN (GLUCOPHAGE) 500 MG tablet TAKE 1 TABLET BY MOUTH TWICE A DAY WITH MEALS 60 tablet 0   • OneTouch Ultra test strip TEST 3 TIMES A  each 5   • pravastatin (PRAVACHOL) 40 MG tablet Take 1 tablet by mouth Daily. 30 tablet 12   • sildenafil (REVATIO) 20 MG tablet Take 1 tablet by mouth 3 (Three) Times a Day. 90 tablet 2   • ciprofloxacin (CIPRO) 500 MG tablet Take 1 tablet by mouth 2 (Two) Times a Day. 20 tablet 0     No facility-administered encounter medications on file as of 3/19/2021.       Past Surgical History:  History reviewed. No pertinent surgical history.     The following portions of the patient's history were reviewed and updated as appropriate: allergies, current medications, past family history, past medical history, past social history, past surgical history and problem list.    Review Of Systems:  Review of Systems   Constitutional: Negative for activity change, appetite change and fatigue.   HENT: Positive for postnasal drip. Negative for ear discharge, ear pain and rhinorrhea.    Eyes: Negative for double vision and discharge.   Respiratory: Positive for cough. Negative for chest tightness and shortness of breath.    Cardiovascular: Negative for chest pain.   Gastrointestinal: Positive for abdominal pain. Negative for blood in stool.   Endocrine: Negative for cold  "intolerance and heat intolerance.   Musculoskeletal: Negative for back pain, gait problem and joint swelling.   Skin: Negative for color change and rash.   Neurological: Negative for dizziness, facial asymmetry and confusion.   Psychiatric/Behavioral: Negative for behavioral problems.       Objective     Physical Exam:  Vital Signs:  Visit Vitals  /82 (BP Location: Left arm, Patient Position: Sitting, Cuff Size: Large Adult)   Pulse 114   Temp 97.1 °F (36.2 °C) (Skin)   Resp 18   Ht 180.3 cm (71\")   Wt 121 kg (266 lb 6.4 oz)   SpO2 95%   BMI 37.16 kg/m²       Physical Exam  Vitals reviewed.   Constitutional:       Appearance: He is well-developed.   HENT:      Head: Normocephalic.      Right Ear: External ear normal.      Left Ear: External ear normal.      Nose: Nose normal.   Eyes:      Conjunctiva/sclera: Conjunctivae normal.   Cardiovascular:      Rate and Rhythm: Normal rate and regular rhythm.      Pulses:           Dorsalis pedis pulses are 1+ on the right side and 1+ on the left side.   Pulmonary:      Effort: Pulmonary effort is normal.      Breath sounds: Normal breath sounds.   Musculoskeletal:         General: Normal range of motion.      Cervical back: Normal range of motion and neck supple.      Right foot: Normal range of motion. Bunion present. No deformity.      Left foot: Normal range of motion. No deformity.   Feet:      Right foot:      Protective Sensation: 10 sites tested. 10 sites sensed.      Skin integrity: Callus present.      Toenail Condition: Right toenails are normal.      Left foot:      Protective Sensation: 10 sites tested. 10 sites sensed.      Skin integrity: Callus present.      Toenail Condition: Left toenails are abnormally thick. Fungal disease present.     Comments: Diabetic Foot Exam Performed and Monofilament Test Performed  Skin:     General: Skin is warm and dry.      Capillary Refill: Capillary refill takes less than 2 seconds.   Neurological:      Mental Status: He " is alert and oriented to person, place, and time.       Diabetic Foot Exam Performed and Monofilament Test Performed    Assessment/Plan   Assessment and Plan:  Diagnoses and all orders for this visit:    1. Type 2 diabetes mellitus without complication, without long-term current use of insulin (CMS/[facility]) (Primary)  Assessment & Plan:  Diabetes is improving with treatment.   Continue current treatment regimen.  Reminded to bring in blood sugar diary at next visit.  Dietary recommendations for ADA diet.  Regular aerobic exercise.  Discussed ways to avoid symptomatic hypoglycemia.  Discussed sick day management.  Discussed foot care.  Diabetes will be reassessed in 3 months.    Orders:  -     POCT Glucose  -     POC Glycosylated Hemoglobin (Hb A1C)    2. Right upper quadrant abdominal pain  Assessment & Plan:  Unknown etiology of symptoms.  This occurs around the areas of his lipomas.  Patient was advised to use heat on the areas and monitor the symptoms if they get worse.      3. Acute non-recurrent maxillary sinusitis  Assessment & Plan:  he was prescribed Cipro to treat his symptoms.    Increase fluids. Tylenol/motrin for pain or fever.   Medication and medication adverse effects discussed.    Follow-up 5-7 days for reevaluation if not improved or sooner if needed.    Orders:  -     ciprofloxacin (CIPRO) 500 MG tablet; Take 1 tablet by mouth 2 (Two) Times a Day.  Dispense: 20 tablet; Refill: 0    4. Morbidly obese (CMS/[facility])

## 2021-04-09 DIAGNOSIS — J01.00 ACUTE NON-RECURRENT MAXILLARY SINUSITIS: Primary | ICD-10-CM

## 2021-04-09 RX ORDER — LEVOFLOXACIN 750 MG/1
750 TABLET ORAL DAILY
Qty: 7 TABLET | Refills: 0 | Status: SHIPPED | OUTPATIENT
Start: 2021-04-09 | End: 2022-01-14

## 2021-04-09 NOTE — TELEPHONE ENCOUNTER
PATIENT CALLED IN REGARDS TO MEDICATION REFILL OF HIS ANTIBIOTIC FOR HIS FLUID IN HIS EARS AND COUGH. HE WAS FEELING GOOD AND NOW HE HAS FINISHED THE MEDICATION HE IS STARTING TO FEEL BAD AGAIN WITH THE COUGH AND PAIN IN HIS RIGHT SIDE, HIS EARS WERE POPPING AND NOW THEY HAVE STOPPED POPPING. HE IS TAKING DELSYM EVERY 12 HOURS      [id number] [place], [place] - [address] - [phone]  - [phone]   [phone]    CALL BACK NUMBER [phone]

## 2021-04-10 DIAGNOSIS — E11.9 TYPE 2 DIABETES MELLITUS WITHOUT COMPLICATION, WITHOUT LONG-TERM CURRENT USE OF INSULIN (HCC): ICD-10-CM

## 2021-05-12 ENCOUNTER — OFFICE VISIT (OUTPATIENT)
Dept: FAMILY MEDICINE CLINIC | Facility: CLINIC | Age: 51
End: 2021-05-12

## 2021-05-12 VITALS
HEART RATE: 98 BPM | SYSTOLIC BLOOD PRESSURE: 123 MMHG | OXYGEN SATURATION: 100 % | TEMPERATURE: 97.8 F | BODY MASS INDEX: 35.87 KG/M2 | HEIGHT: 71 IN | WEIGHT: 256.2 LBS | DIASTOLIC BLOOD PRESSURE: 82 MMHG | RESPIRATION RATE: 18 BRPM

## 2021-05-12 DIAGNOSIS — Z20.822 SUSPECTED COVID-19 VIRUS INFECTION: Primary | ICD-10-CM

## 2021-05-12 DIAGNOSIS — K52.9 GASTROENTERITIS: ICD-10-CM

## 2021-05-12 PROCEDURE — 99213 OFFICE O/P EST LOW 20 MIN: CPT | Performed by: FAMILY MEDICINE

## 2021-05-12 NOTE — ASSESSMENT & PLAN NOTE
Increase fluids, liquid diet only while vomiting, advance to bland diet once vomiting stops. Discussed dehydration signs and symptoms. Return in 2 days or sooner if needed. If acutely worse, go to the ER.   immodium discussed    Diagnosis, treatment and and course discussed. Potential side effects discussed. Return if there is worsening or persistence of symptoms.   Stop hyperglycemia medications while not eating.

## 2021-05-12 NOTE — PROGRESS NOTES
Subjective   Devendra Ramírez is a 50 y.o. male.     Chief Complaint   Patient presents with   • Vomiting   • Diarrhea       Patient thinks that he has food poisoning.     Vomiting   This is a new problem. The current episode started yesterday. The problem occurs 2 to 4 times per day (has not thrown up since yesterday). The problem has been gradually worsening. The emesis has an appearance of stomach contents. The maximum temperature recorded prior to his arrival was 100.4 - 100.9 F. The fever has been present for less than 1 day. Associated symptoms include abdominal pain, diarrhea and a fever (yesterday). Pertinent negatives include no arthralgias, chest pain, chills, coughing, dizziness, headaches or sweats. Risk factors include suspect food intake. Treatments tried: imdoium, asprin.   Diarrhea   This is a new problem. The current episode started yesterday. The problem occurs 2 to 4 times per day. The problem has been gradually worsening. Associated symptoms include abdominal pain, a fever (yesterday) and vomiting. Pertinent negatives include no arthralgias, bloating, chills, coughing, headaches or sweats. Risk factors include suspect food intake. Treatments tried: imodium. The treatment provided mild relief.    He ate a blue berry muffin and coffee before his occurred. There was cream in his coffee. He had pain and diarrhea right after. He got a fever as well. He is not sure of the temp other than he was warm. He took aspirin. Pepto did not help. The diarrhea is gone. Nausea is not as bad. He is fatigued.     I personally reviewed and updated the patient's allergies, medications, problem list, and past medical, surgical, social, and family history. I have reviewed and confirmed the accuracy of the History of Present Illness and Review of Symptoms as documented by the MA/LPN/RN. Nisha Arellano MD    Allergies:  No Known Allergies    Social History:  Social History     Socioeconomic History   • Marital status:  Single     Spouse name: Not on file   • Number of children: Not on file   • Years of education: Not on file   • Highest education level: Not on file   Tobacco Use   • Smoking status: Never Smoker   • Smokeless tobacco: Current User     Types: Chew   Substance and Sexual Activity   • Alcohol use: Yes     Comment: 3 drinks a month   • Drug use: No       Family History:  Family History   Problem Relation Age of Onset   • Diabetes Father        Past Medical History :  Patient Active Problem List   Diagnosis   • Type 2 diabetes mellitus without complication, without long-term current use of insulin (CMS/[facility])   • Elevated blood pressure reading   • Blurry vision, bilateral   • Cheilitis   • Overweight   • Tobacco use disorder   • Physical exam   • Essential hypertension   • Mixed hyperlipidemia   • Erectile dysfunction   • Morbidly obese (CMS/HCC)   • Acute non-recurrent maxillary sinusitis   • Right upper quadrant abdominal pain   • Gastroenteritis       Medication List:    Current Outpatient Medications:   •  ciprofloxacin (CIPRO) 500 MG tablet, Take 1 tablet by mouth 2 (Two) Times a Day., Disp: 20 tablet, Rfl: 0  •  diclofenac (VOLTAREN) 75 MG EC tablet, Take 75 mg by mouth 2 (Two) Times a Day., Disp: , Rfl:   •  glucose monitor monitoring kit, 1 each Daily. Use to check blood sugar 1 time a day, Disp: 1 each, Rfl: 0  •  glyburide (DIAbeta) 5 MG tablet, Take 1 tablet by mouth 2 (Two) Times a Day With Meals., Disp: 180 tablet, Rfl: 3  •  Lancets misc, 1 Device Daily., Disp: 90 each, Rfl: 0  •  levoFLOXacin (Levaquin) 750 MG tablet, Take 1 tablet by mouth Daily., Disp: 7 tablet, Rfl: 0  •  lisinopril (PRINIVIL,ZESTRIL) 20 MG tablet, Take 1 tablet by mouth Daily., Disp: 90 tablet, Rfl: 3  •  metFORMIN (GLUCOPHAGE) 500 MG tablet, TAKE 1 TABLET BY MOUTH TWICE A DAY WITH MEALS, Disp: 60 tablet, Rfl: 0  •  OneTouch Ultra test strip, TEST 3 TIMES A DAY, Disp: 100 each, Rfl: 5  •  pravastatin (PRAVACHOL) 40 MG tablet, Take 1  "tablet by mouth Daily., Disp: 30 tablet, Rfl: 12  •  sildenafil (REVATIO) 20 MG tablet, Take 1 tablet by mouth 3 (Three) Times a Day., Disp: 90 tablet, Rfl: 2    Past Surgical History:  History reviewed. No pertinent surgical history.    Review of Systems:  Review of Systems   Constitutional: Positive for fever (yesterday). Negative for activity change and chills.   HENT: Negative for ear pain, rhinorrhea, sinus pressure and voice change.    Eyes: Negative for visual disturbance.   Respiratory: Negative for cough and shortness of breath.    Cardiovascular: Negative for chest pain.   Gastrointestinal: Positive for abdominal pain, diarrhea and vomiting. Negative for bloating and nausea.   Endocrine: Negative for cold intolerance and heat intolerance.   Genitourinary: Negative for frequency and urgency.   Musculoskeletal: Negative for arthralgias.   Skin: Negative for rash.   Neurological: Negative for dizziness and syncope.   Hematological: Does not bruise/bleed easily.   Psychiatric/Behavioral: Negative for depressed mood. The patient is not nervous/anxious.        Physical Exam:  Vital Signs:  Vital Signs:   /82   Pulse 98   Temp 97.8 °F (36.6 °C)   Resp 18   Ht 180.3 cm (71\")   Wt 116 kg (256 lb 3.2 oz)   SpO2 100%   BMI 35.73 kg/m²     Result Review :                Physical Exam  Vitals reviewed.   Constitutional:       Appearance: Normal appearance. He is well-developed.   HENT:      Head: Normocephalic and atraumatic.      Right Ear: External ear normal. No decreased hearing noted. No tenderness. No middle ear effusion. Tympanic membrane is not injected, erythematous, retracted or bulging.      Left Ear: External ear normal. No decreased hearing noted. No tenderness.  No middle ear effusion. Tympanic membrane is not injected, erythematous, retracted or bulging.      Nose: Nose normal. No rhinorrhea.      Mouth/Throat:      Pharynx: No oropharyngeal exudate or posterior oropharyngeal erythema. "   Eyes:      General:         Right eye: No discharge.         Left eye: No discharge.   Cardiovascular:      Rate and Rhythm: Normal rate and regular rhythm.      Heart sounds: Normal heart sounds. No murmur heard.   No friction rub. No gallop.    Pulmonary:      Effort: Pulmonary effort is normal. No respiratory distress.      Breath sounds: Normal breath sounds. No wheezing or rales.   Lymphadenopathy:      Cervical: No cervical adenopathy.   Skin:     General: Skin is warm and dry.      Findings: No rash.   Neurological:      Mental Status: He is alert and oriented to person, place, and time.      Coordination: Coordination normal.      Gait: Gait normal.   Psychiatric:         Behavior: Behavior is cooperative.         Assessment and Plan:  Problems Addressed this Visit        Gastrointestinal Abdominal     Gastroenteritis     Increase fluids, liquid diet only while vomiting, advance to bland diet once vomiting stops. Discussed dehydration signs and symptoms. Return in 2 days or sooner if needed. If acutely worse, go to the ER.   immodium discussed    Diagnosis, treatment and and course discussed. Potential side effects discussed. Return if there is worsening or persistence of symptoms.   Stop hyperglycemia medications while not eating.                Other Visit Diagnoses     Suspected COVID-19 virus infection    -  Primary    Relevant Orders    COVID-19,[facility] ROUTINE, NP/OP SWAB IN TRANSPORT MEDIA OR ESWAB 72 HR TAT - Swab, Nasopharynx      Diagnoses       Codes Comments    Suspected COVID-19 virus infection    -  Primary ICD-10-CM: Z20.822  ICD-9-CM: V01.79     Gastroenteritis     ICD-10-CM: K52.9  ICD-9-CM: 558.9            An After Visit Summary and PPPS were given to the patient.         I wore protective equipment throughout this patient encounter to include mask and gloves. Hand hygiene was performed before donning protective equipment and after removal when leaving the room.

## 2021-05-13 LAB
LABCORP SARS-COV-2, NAA 2 DAY TAT: NORMAL
SARS-COV-2 RNA RESP QL NAA+PROBE: NOT DETECTED

## 2021-05-14 ENCOUNTER — TELEPHONE (OUTPATIENT)
Dept: FAMILY MEDICINE CLINIC | Facility: CLINIC | Age: 51
End: 2021-05-14

## 2021-05-14 NOTE — TELEPHONE ENCOUNTER
Patient called and was informed of covid results. Patient states he is still having a lot of diarrhea.

## 2021-05-14 NOTE — TELEPHONE ENCOUNTER
Ask how bad the diarrhea so I know if we need to get labs. Increase fluids, and  bland diet once vomiting. Discuss dehydration signs and symptoms. Start immodium OTC. See Dr Chung

## 2021-07-08 DIAGNOSIS — E11.9 TYPE 2 DIABETES MELLITUS WITHOUT COMPLICATION, WITHOUT LONG-TERM CURRENT USE OF INSULIN (HCC): ICD-10-CM

## 2021-07-08 RX ORDER — GLYBURIDE 5 MG/1
5 TABLET ORAL 2 TIMES DAILY WITH MEALS
Qty: 180 TABLET | Refills: 3 | Status: SHIPPED | OUTPATIENT
Start: 2021-07-08 | End: 2021-07-12 | Stop reason: SDUPTHER

## 2021-07-09 ENCOUNTER — TELEPHONE (OUTPATIENT)
Dept: FAMILY MEDICINE CLINIC | Facility: CLINIC | Age: 51
End: 2021-07-09

## 2021-07-09 DIAGNOSIS — E11.9 TYPE 2 DIABETES MELLITUS WITHOUT COMPLICATION, WITHOUT LONG-TERM CURRENT USE OF INSULIN (HCC): ICD-10-CM

## 2021-07-09 NOTE — TELEPHONE ENCOUNTER
Caller: Devendra Ramírez    Relationship: Self    Best call back number: [phone]      What was the call regarding: PATIENT STATES THAT [facility]NTIL MONDAY SO HE WOULD LIKE THE PRESCRIPTION SENT TO [facility]STE[facility].    [id number] - [place], [place] - [address]  [address] - [phone]  - [phone]   [phone]

## 2021-07-12 DIAGNOSIS — E11.9 TYPE 2 DIABETES MELLITUS WITHOUT COMPLICATION, WITHOUT LONG-TERM CURRENT USE OF INSULIN (HCC): ICD-10-CM

## 2021-07-12 RX ORDER — GLYBURIDE 5 MG/1
5 TABLET ORAL 2 TIMES DAILY WITH MEALS
Qty: 180 TABLET | Refills: 1 | Status: SHIPPED | OUTPATIENT
Start: 2021-07-12 | End: 2022-01-14 | Stop reason: SDUPTHER

## 2021-07-23 ENCOUNTER — TELEPHONE (OUTPATIENT)
Dept: FAMILY MEDICINE CLINIC | Facility: CLINIC | Age: 51
End: 2021-07-23

## 2021-07-23 NOTE — TELEPHONE ENCOUNTER
Patient had an 11:00 appointment for today. He came in and immediately sat down but did not go to the front counter for check in. At 11:15 he went up front asking why he hadn't been called back. He was then registered.     I went to bring this patient back at 11:29 and the front came back and told me he said he didn't have time to wait and left without being seen.

## 2021-12-02 DIAGNOSIS — E78.2 MIXED HYPERLIPIDEMIA: ICD-10-CM

## 2021-12-02 DIAGNOSIS — I10 ESSENTIAL HYPERTENSION: Primary | ICD-10-CM

## 2021-12-02 DIAGNOSIS — R53.83 MALAISE AND FATIGUE: ICD-10-CM

## 2021-12-02 DIAGNOSIS — Z13.29 SCREENING FOR HYPOTHYROIDISM: ICD-10-CM

## 2021-12-02 DIAGNOSIS — R53.81 MALAISE AND FATIGUE: ICD-10-CM

## 2021-12-02 DIAGNOSIS — E11.9 TYPE 2 DIABETES MELLITUS WITHOUT COMPLICATION, WITHOUT LONG-TERM CURRENT USE OF INSULIN (HCC): ICD-10-CM

## 2021-12-16 LAB
25(OH)D3+25(OH)D2 SERPL-MCNC: 21.2 NG/ML (ref 30–100)
ALBUMIN SERPL-MCNC: 4.6 G/DL (ref 4–5)
ALBUMIN/GLOB SERPL: 2.2 {RATIO} (ref 1.2–2.2)
ALP SERPL-CCNC: 85 IU/L (ref 44–121)
ALT SERPL-CCNC: 35 IU/L (ref 0–44)
AST SERPL-CCNC: 22 IU/L (ref 0–40)
BASOPHILS # BLD AUTO: 0 X10E3/UL (ref 0–0.2)
BASOPHILS NFR BLD AUTO: 0 %
BILIRUB SERPL-MCNC: 0.6 MG/DL (ref 0–1.2)
BUN SERPL-MCNC: 11 MG/DL (ref 6–24)
BUN/CREAT SERPL: 12 (ref 9–20)
CALCIUM SERPL-MCNC: 9.7 MG/DL (ref 8.7–10.2)
CHLORIDE SERPL-SCNC: 100 MMOL/L (ref 96–106)
CHOLEST SERPL-MCNC: 265 MG/DL (ref 100–199)
CHOLEST/HDLC SERPL: 7 RATIO (ref 0–5)
CO2 SERPL-SCNC: 26 MMOL/L (ref 20–29)
CREAT SERPL-MCNC: 0.9 MG/DL (ref 0.76–1.27)
EOSINOPHIL # BLD AUTO: 0.1 X10E3/UL (ref 0–0.4)
EOSINOPHIL NFR BLD AUTO: 2 %
ERYTHROCYTE [DISTWIDTH] IN BLOOD BY AUTOMATED COUNT: 11.9 % (ref 11.6–15.4)
FOLATE SERPL-MCNC: 13.5 NG/ML
GLOBULIN SER CALC-MCNC: 2.1 G/DL (ref 1.5–4.5)
GLUCOSE SERPL-MCNC: 127 MG/DL (ref 65–99)
HBA1C MFR BLD: 9.5 % (ref 4.8–5.6)
HCT VFR BLD AUTO: 43.2 % (ref 37.5–51)
HDLC SERPL-MCNC: 38 MG/DL
HGB BLD-MCNC: 15 G/DL (ref 13–17.7)
IMM GRANULOCYTES # BLD AUTO: 0 X10E3/UL (ref 0–0.1)
IMM GRANULOCYTES NFR BLD AUTO: 0 %
LDLC SERPL CALC-MCNC: 174 MG/DL (ref 0–99)
LYMPHOCYTES # BLD AUTO: 2 X10E3/UL (ref 0.7–3.1)
LYMPHOCYTES NFR BLD AUTO: 32 %
MCH RBC QN AUTO: 30.5 PG (ref 26.6–33)
MCHC RBC AUTO-ENTMCNC: 34.7 G/DL (ref 31.5–35.7)
MCV RBC AUTO: 88 FL (ref 79–97)
MONOCYTES # BLD AUTO: 0.5 X10E3/UL (ref 0.1–0.9)
MONOCYTES NFR BLD AUTO: 8 %
NEUTROPHILS # BLD AUTO: 3.8 X10E3/UL (ref 1.4–7)
NEUTROPHILS NFR BLD AUTO: 58 %
PLATELET # BLD AUTO: 299 X10E3/UL (ref 150–450)
POTASSIUM SERPL-SCNC: 4.2 MMOL/L (ref 3.5–5.2)
PROT SERPL-MCNC: 6.7 G/DL (ref 6–8.5)
RBC # BLD AUTO: 4.92 X10E6/UL (ref 4.14–5.8)
SODIUM SERPL-SCNC: 141 MMOL/L (ref 134–144)
TRIGL SERPL-MCNC: 276 MG/DL (ref 0–149)
TSH SERPL DL<=0.005 MIU/L-ACNC: 1.33 UIU/ML (ref 0.45–4.5)
VIT B12 SERPL-MCNC: 525 PG/ML (ref 232–1245)
VLDLC SERPL CALC-MCNC: 53 MG/DL (ref 5–40)
WBC # BLD AUTO: 6.4 X10E3/UL (ref 3.4–10.8)

## 2021-12-21 ENCOUNTER — TELEPHONE (OUTPATIENT)
Dept: FAMILY MEDICINE CLINIC | Facility: CLINIC | Age: 51
End: 2021-12-21

## 2021-12-21 LAB
TESTOST FREE SERPL-MCNC: 4.8 PG/ML (ref 7.2–24)
TESTOST SERPL-MCNC: 147.1 NG/DL (ref 264–916)

## 2021-12-21 NOTE — TELEPHONE ENCOUNTER
Caller: Devendra Ramírez    Relationship: Self    Best call back number: [phone]     Caller requesting test results: SELF    What test was performed: LABS    When was the test performed: 12/15    Where was the test performed: [facility]

## 2022-01-03 NOTE — TELEPHONE ENCOUNTER
NAOMIE 01/03/2022, 2:52pm advisied him Dr. Chung will go over his lab results more thououhly at his next vist.  Advised him his A1c, chol elevated, testosterone, vitamin D low.

## 2022-01-13 NOTE — PROGRESS NOTES
Chief Complaint  Annual Exam, Diabetes, and Hypertension    Subjective    History of Present Illness        Devendra Ramírez presents to [facility] for   Diabetes  He presents for his follow-up diabetic visit. He has type 2 diabetes mellitus. No MedicAlert identification noted. His disease course has been stable. Pertinent negatives for hypoglycemia include no confusion, dizziness, headaches, hunger, mood changes, nervousness/anxiousness, pallor, seizures, sleepiness, speech difficulty, sweats or tremors. (Nausea) Pertinent negatives for diabetes include no blurred vision, no chest pain and no fatigue. There are no hypoglycemic complications. Pertinent negatives for hypoglycemia complications include no blackouts, no hospitalization, no nocturnal hypoglycemia, no required assistance and no required glucagon injection. Symptoms are stable. There are no diabetic complications. Pertinent negatives for diabetic complications include no autonomic neuropathy, CVA, heart disease, impotence, nephropathy, peripheral neuropathy, PVD or retinopathy. Risk factors for coronary artery disease include diabetes mellitus, hypertension, male sex and obesity. Current diabetic treatment includes oral agent (dual therapy). He is compliant with treatment all of the time. His weight is stable. He is following a generally healthy diet. When asked about meal planning, he reported none. He has not had a previous visit with a dietitian. He never participates in exercise. His home blood glucose trend is fluctuating minimally. His breakfast blood glucose is taken between 5-6 am. His breakfast blood glucose range is generally 110-130 mg/dl. His dinner blood glucose is taken between 4-5 pm. His dinner blood glucose range is generally  mg/dl. (1/14/2022- The patient states that he has been checking his blood sugars at home. He states that before eating he is in 100's to 100 teens and he states that in the  evenings it is in the 200's. He states that he lost his father in September and he states that he got on a spell that he didn't care and he states that he noticed this in his levels, but he states that he has since gotten back on track and he states that just feels he has to do this. ) An ACE inhibitor/angiotensin II receptor blocker is being taken. He does not see a podiatrist.Eye exam is current.   Hypertension  This is a chronic problem. The current episode started more than 1 year ago. The problem has been gradually worsening since onset. The problem is uncontrolled (patient states that he stopped tkaing his lisinopril as he states that he had a bad cough with it and he states that he thought it was from covid but he states that when he realizewd it was not and it was possibly the medication he stopped tkaing it ). Associated symptoms include malaise/fatigue. Pertinent negatives include no anxiety, blurred vision, chest pain, headaches, neck pain, orthopnea, palpitations, peripheral edema, PND, shortness of breath or sweats. Risk factors for coronary artery disease include family history, male gender, obesity, diabetes mellitus and stress. Past treatments include ACE inhibitors. Current antihypertension treatment includes nothing. The current treatment provides no improvement. Compliance problems include medication side effects.  There is no history of angina, kidney disease, CAD/MI, CVA, heart failure, left ventricular hypertrophy, PVD or retinopathy. There is no history of chronic renal disease, coarctation of the aorta, hyperaldosteronism, hypercortisolism, hyperparathyroidism, a hypertension causing med, pheochromocytoma, renovascular disease, sleep apnea or a thyroid problem.      Physical Exam:   Devendra AKHTAR Romainsamantha is a 51 y.o. male here for his annual physical with me. Devendra is here for coordination of medical care, to discuss health maintenance, disease prevention as well as to followup on medical  "problems. Patient has been followed by me since for some time now . Patient's last CPE was 2021. Activity level is moderate. Exercises 3 per week. Appetite is good. Feels well with few complaints. Energy level is good. Sleeps fairly well. Patient has never had a colonoscopy.  Patient is doing routine self skin exam monthly.     Objective   Vital Signs:   Visit Vitals  /95   Pulse 90   Temp 98.2 °F (36.8 °C)   Resp 18   Ht 180.3 cm (71\")   Wt 119 kg (263 lb 3.2 oz)   SpO2 97%   BMI 36.71 kg/m²       Physical Exam  Vitals reviewed.   Constitutional:       Appearance: He is well-developed.   HENT:      Head: Normocephalic and atraumatic.      Nose: Nose normal.   Eyes:      General: Lids are normal.      Conjunctiva/sclera: Conjunctivae normal.      Pupils: Pupils are equal, round, and reactive to light.   Cardiovascular:      Rate and Rhythm: Normal rate and regular rhythm.      Pulses: Normal pulses.      Heart sounds: Normal heart sounds.   Pulmonary:      Effort: Pulmonary effort is normal. No respiratory distress.      Breath sounds: Normal breath sounds.   Abdominal:      General: Bowel sounds are normal.      Palpations: Abdomen is soft.   Musculoskeletal:         General: Normal range of motion.      Cervical back: Normal range of motion and neck supple.   Skin:     General: Skin is warm and dry.      Capillary Refill: Capillary refill takes less than 2 seconds.   Neurological:      Mental Status: He is alert and oriented to person, place, and time.   Psychiatric:         Behavior: Behavior normal.         Thought Content: Thought content normal.         Judgment: Judgment normal.            Result Review :                  Recent Results (from the past 1344 hour(s))   CBC & Differential    Collection Time: 12/15/21  2:49 PM    Specimen: Blood   Result Value Ref Range    WBC 6.4 3.4 - 10.8 x10E3/uL    RBC 4.92 4.14 - 5.80 x10E6/uL    Hemoglobin 15.0 13.0 - 17.7 g/dL    Hematocrit 43.2 37.5 - 51.0 %    MCV " 88 79 - 97 fL    MCH 30.5 26.6 - 33.0 pg    MCHC 34.7 31.5 - 35.7 g/dL    RDW 11.9 11.6 - 15.4 %    Platelets 299 150 - 450 x10E3/uL    Neutrophil Rel % 58 Not Estab. %    Lymphocyte Rel % 32 Not Estab. %    Monocyte Rel % 8 Not Estab. %    Eosinophil Rel % 2 Not Estab. %    Basophil Rel % 0 Not Estab. %    Neutrophils Absolute 3.8 1.4 - 7.0 x10E3/uL    Lymphocytes Absolute 2.0 0.7 - 3.1 x10E3/uL    Monocytes Absolute 0.5 0.1 - 0.9 x10E3/uL    Eosinophils Absolute 0.1 0.0 - 0.4 x10E3/uL    Basophils Absolute 0.0 0.0 - 0.2 x10E3/uL    Immature Granulocyte Rel % 0 Not Estab. %    Immature Grans Absolute 0.0 0.0 - 0.1 x10E3/uL   Comprehensive Metabolic Panel    Collection Time: 12/15/21  2:49 PM    Specimen: Blood   Result Value Ref Range    Glucose 127 (H) 65 - 99 mg/dL    BUN 11 6 - 24 mg/dL    Creatinine 0.90 0.76 - 1.27 mg/dL    eGFR Non African Am 99 >59 mL/min/1.73    eGFR African Am 115 >59 mL/min/1.73    BUN/Creatinine Ratio 12 9 - 20    Sodium 141 134 - 144 mmol/L    Potassium 4.2 3.5 - 5.2 mmol/L    Chloride 100 96 - 106 mmol/L    Total CO2 26 20 - 29 mmol/L    Calcium 9.7 8.7 - 10.2 mg/dL    Total Protein 6.7 6.0 - 8.5 g/dL    Albumin 4.6 4.0 - 5.0 g/dL    Globulin 2.1 1.5 - 4.5 g/dL    A/G Ratio 2.2 1.2 - 2.2    Total Bilirubin 0.6 0.0 - 1.2 mg/dL    Alkaline Phosphatase 85 44 - 121 IU/L    AST (SGOT) 22 0 - 40 IU/L    ALT (SGPT) 35 0 - 44 IU/L   Lipid Panel With / Chol / HDL Ratio    Collection Time: 12/15/21  2:49 PM    Specimen: Blood   Result Value Ref Range    Total Cholesterol 265 (H) 100 - 199 mg/dL    Triglycerides 276 (H) 0 - 149 mg/dL    HDL Cholesterol 38 (L) >39 mg/dL    VLDL Cholesterol Nate 53 (H) 5 - 40 mg/dL    LDL Chol Calc (NIH) 174 (H) 0 - 99 mg/dL    Chol/HDL Ratio 7.0 (H) 0.0 - 5.0 ratio   TSH    Collection Time: 12/15/21  2:49 PM    Specimen: Blood   Result Value Ref Range    TSH 1.330 0.450 - 4.500 uIU/mL   Vitamin D 25 Hydroxy    Collection Time: 12/15/21  2:49 PM    Specimen: Blood    Result Value Ref Range    25 Hydroxy, Vitamin D 21.2 (L) 30.0 - 100.0 ng/mL   Vitamin B12    Collection Time: 12/15/21  2:49 PM    Specimen: Blood   Result Value Ref Range    Vitamin B-12 525 232 - 1,245 pg/mL   Folate    Collection Time: 12/15/21  2:49 PM    Specimen: Blood   Result Value Ref Range    Folate 13.5 >3.0 ng/mL   Testosterone (Free & Total), LC / MS    Collection Time: 12/15/21  2:49 PM    Specimen: Blood   Result Value Ref Range    Testosterone, Total 147.1 (L) 264.0 - 916.0 ng/dL    Testosterone, Free 4.8 (L) 7.2 - 24.0 pg/mL   Hemoglobin A1c    Collection Time: 12/15/21  2:49 PM    Specimen: Blood   Result Value Ref Range    Hemoglobin A1C 9.5 (H) 4.8 - 5.6 %   POCT Glucose    Collection Time: 01/14/22 10:51 AM    Specimen: Blood   Result Value Ref Range    Glucose 216 (A) 70 - 130 mg/dL   POC Glycosylated Hemoglobin (Hb A1C)    Collection Time: 01/14/22 10:51 AM    Specimen: Blood   Result Value Ref Range    Hemoglobin A1C 8.4 %    Lot Number [id number]     Expiration Date 09/30/2023      Assessment and Plan      Diagnoses and all orders for this visit:    1. Physical exam (Primary)  Assessment & Plan:  Discussed injury prevention, diet and exercise, safe sexual practices, and screening for common diseases. Encouraged use of sunscreen and seatbelts. Avoidance of tobacco encouraged. Limitation or avoidance of alcohol encouraged. Recommend yearly dental and eye exams. Also discussed monitoring of blood pressure, lipids.      2. Type 2 diabetes mellitus without complication, without long-term current use of insulin ([facility])  Assessment & Plan:  Diabetes is worsening.   Continue current treatment regimen.  Reminded to bring in blood sugar diary at next visit.  Dietary recommendations for ADA diet.  Regular aerobic exercise.  Discussed ways to avoid symptomatic hypoglycemia.  Discussed sick day management.  Discussed foot care.  Diabetes will be reassessed in 6 months.    Orders:  -     metFORMIN  (GLUCOPHAGE) 500 MG tablet; Take 1 tablet by mouth 2 (Two) Times a Day With Meals.  Dispense: 180 tablet; Refill: 2  -     glyburide (DIAbeta) 5 MG tablet; Take 1 tablet by mouth 2 (Two) Times a Day With Meals.  Dispense: 180 tablet; Refill: 2    3. Uncontrolled type 2 diabetes mellitus with hyperglycemia (HCC)  -     POCT Glucose  -     POC Glycosylated Hemoglobin (Hb A1C)    4. Essential hypertension  Assessment & Plan:  Hypertension is worsening.  Continue current treatment regimen.  Dietary sodium restriction.  Weight loss.  Regular aerobic exercise.  Blood pressure will be reassessed at the next regular appointment.    Orders:  -     Discontinue: losartan (COZAAR) 100 MG tablet; Take 1 tablet by mouth Daily.  Dispense: 30 tablet; Refill: 1  -     losartan (COZAAR) 100 MG tablet; Take 1 tablet by mouth Daily.  Dispense: 30 tablet; Refill: 1    5. Mixed hyperlipidemia  Assessment & Plan:  Lipid abnormalities are worsening.  Nutritional counseling was provided.  Lipids will be reassessed in 6 months.    Orders:  -     pravastatin (PRAVACHOL) 40 MG tablet; Take 1 tablet by mouth Daily.  Dispense: 90 tablet; Refill: 2    6. Erectile dysfunction, unspecified erectile dysfunction type  Assessment & Plan:  Pt given a refill on his medication.    Orders:  -     sildenafil (REVATIO) 20 MG tablet; Take 1 tablet by mouth 3 (Three) Times a Day.  Dispense: 90 tablet; Refill: 2           Follow Up   No follow-ups on file.  Patient was given instructions and counseling regarding his condition or for health maintenance advice. Please see specific information pulled into the AVS if appropriate.

## 2022-01-14 ENCOUNTER — OFFICE VISIT (OUTPATIENT)
Dept: FAMILY MEDICINE CLINIC | Facility: CLINIC | Age: 52
End: 2022-01-14

## 2022-01-14 VITALS
HEIGHT: 71 IN | WEIGHT: 263.2 LBS | SYSTOLIC BLOOD PRESSURE: 173 MMHG | DIASTOLIC BLOOD PRESSURE: 95 MMHG | OXYGEN SATURATION: 97 % | BODY MASS INDEX: 36.85 KG/M2 | TEMPERATURE: 98.2 F | HEART RATE: 90 BPM | RESPIRATION RATE: 18 BRPM

## 2022-01-14 DIAGNOSIS — N52.9 ERECTILE DYSFUNCTION, UNSPECIFIED ERECTILE DYSFUNCTION TYPE: ICD-10-CM

## 2022-01-14 DIAGNOSIS — E11.65 UNCONTROLLED TYPE 2 DIABETES MELLITUS WITH HYPERGLYCEMIA: ICD-10-CM

## 2022-01-14 DIAGNOSIS — E78.2 MIXED HYPERLIPIDEMIA: ICD-10-CM

## 2022-01-14 DIAGNOSIS — Z00.00 PHYSICAL EXAM: Primary | ICD-10-CM

## 2022-01-14 DIAGNOSIS — I10 ESSENTIAL HYPERTENSION: ICD-10-CM

## 2022-01-14 DIAGNOSIS — E11.9 TYPE 2 DIABETES MELLITUS WITHOUT COMPLICATION, WITHOUT LONG-TERM CURRENT USE OF INSULIN: ICD-10-CM

## 2022-01-14 LAB
EXPIRATION DATE: NORMAL
GLUCOSE BLDC GLUCOMTR-MCNC: 216 MG/DL (ref 70–130)
HBA1C MFR BLD: 8.4 %
Lab: NORMAL

## 2022-01-14 PROCEDURE — 99396 PREV VISIT EST AGE 40-64: CPT | Performed by: FAMILY MEDICINE

## 2022-01-14 PROCEDURE — 82962 GLUCOSE BLOOD TEST: CPT | Performed by: FAMILY MEDICINE

## 2022-01-14 PROCEDURE — 83036 HEMOGLOBIN GLYCOSYLATED A1C: CPT | Performed by: FAMILY MEDICINE

## 2022-01-14 PROCEDURE — 99214 OFFICE O/P EST MOD 30 MIN: CPT | Performed by: FAMILY MEDICINE

## 2022-01-14 RX ORDER — LOSARTAN POTASSIUM 100 MG/1
100 TABLET ORAL DAILY
Qty: 30 TABLET | Refills: 1 | Status: SHIPPED | OUTPATIENT
Start: 2022-01-14 | End: 2022-01-19

## 2022-01-14 RX ORDER — GLYBURIDE 5 MG/1
5 TABLET ORAL 2 TIMES DAILY WITH MEALS
Qty: 180 TABLET | Refills: 2 | Status: SHIPPED | OUTPATIENT
Start: 2022-01-14

## 2022-01-14 RX ORDER — LOSARTAN POTASSIUM 100 MG/1
100 TABLET ORAL DAILY
Qty: 30 TABLET | Refills: 1 | Status: SHIPPED | OUTPATIENT
Start: 2022-01-14 | End: 2022-01-14 | Stop reason: SDUPTHER

## 2022-01-14 RX ORDER — GLYBURIDE 5 MG/1
5 TABLET ORAL 2 TIMES DAILY WITH MEALS
Qty: 180 TABLET | Refills: 1 | Status: CANCELLED | OUTPATIENT
Start: 2022-01-14

## 2022-01-14 RX ORDER — SILDENAFIL CITRATE 20 MG/1
20 TABLET ORAL 3 TIMES DAILY
Qty: 90 TABLET | Refills: 2 | Status: SHIPPED | OUTPATIENT
Start: 2022-01-14

## 2022-01-14 RX ORDER — PRAVASTATIN SODIUM 40 MG
40 TABLET ORAL DAILY
Qty: 30 TABLET | Refills: 12 | Status: CANCELLED | OUTPATIENT
Start: 2022-01-14

## 2022-01-14 RX ORDER — PRAVASTATIN SODIUM 40 MG
40 TABLET ORAL DAILY
Qty: 90 TABLET | Refills: 2 | Status: SHIPPED | OUTPATIENT
Start: 2022-01-14

## 2022-01-14 NOTE — ASSESSMENT & PLAN NOTE
Diabetes is worsening.   Continue current treatment regimen.  Reminded to bring in blood sugar diary at next visit.  Dietary recommendations for ADA diet.  Regular aerobic exercise.  Discussed ways to avoid symptomatic hypoglycemia.  Discussed sick day management.  Discussed foot care.  Diabetes will be reassessed in 6 months.

## 2022-01-19 RX ORDER — VALSARTAN 160 MG/1
160 TABLET ORAL DAILY
Qty: 30 TABLET | Refills: 1 | Status: SHIPPED | OUTPATIENT
Start: 2022-01-19

## 2022-01-20 ENCOUNTER — TELEPHONE (OUTPATIENT)
Dept: FAMILY MEDICINE CLINIC | Facility: CLINIC | Age: 52
End: 2022-01-20

## 2022-01-20 NOTE — TELEPHONE ENCOUNTER
Caller: [facility] - [place], [place] - [address] [phone] [facility] [phone]     Relationship: Pharmacy    Best call back number:[phone] ASK FRANCO HAWKINS-DIRECT LINE    What is the best time to reach you: ANY    Who are you requesting to speak with (clinical staff, provider,  specific staff member): CLINICAL    What was the call regarding: PATIENT STOPPED TAKING THESE MEDICATIONS ABOUT 4-6 MONTHS   metFORMIN (GLUCOPHAGE) 500 MG tablet   glyburide (DIAbeta) 5 MG tablet   pravastatin (PRAVACHOL) 40 MG tablet  LISINOPRIL 20 MG    JUST WANTED TO LET DR KNOW THAT THE PATIENT DID STOP TAKING THESE MEDICATIONS. ALSO NEEDS TO KNOW IF THE PATIENT IS STILL TAKING THESE AND WHEN HIS LAST OFFICE VISIT WAS.     LINE IS SECURE, INSTRUCTED TO LEAVE VOICEMAIL.

## 2022-01-20 NOTE — TELEPHONE ENCOUNTER
I have called [facility] and spoke to bernardino and explained  we was aware that he was out as he had not been in the office in some time and that was the purpose of us resending in the medications

## 2022-02-14 ENCOUNTER — CLINICAL SUPPORT (OUTPATIENT)
Dept: FAMILY MEDICINE CLINIC | Facility: CLINIC | Age: 52
End: 2022-02-14

## 2022-02-14 DIAGNOSIS — E11.9 TYPE 2 DIABETES MELLITUS WITHOUT COMPLICATION, WITHOUT LONG-TERM CURRENT USE OF INSULIN: Primary | ICD-10-CM

## 2022-02-14 LAB
EXPIRATION DATE: NORMAL
HBA1C MFR BLD: 7.4 %
Lab: NORMAL

## 2022-02-14 PROCEDURE — 83036 HEMOGLOBIN GLYCOSYLATED A1C: CPT | Performed by: FAMILY MEDICINE

## 2022-07-28 DIAGNOSIS — I10 ESSENTIAL HYPERTENSION: ICD-10-CM

## 2022-07-29 RX ORDER — LOSARTAN POTASSIUM 100 MG/1
TABLET ORAL
Qty: 30 TABLET | Refills: 11 | OUTPATIENT
Start: 2022-07-29

## 2024-11-09 NOTE — ASSESSMENT & PLAN NOTE
Hypertension is worsening.  Continue current treatment regimen.  Dietary sodium restriction.  Weight loss.  Regular aerobic exercise.  Blood pressure will be reassessed at the next regular appointment.   Principal Discharge DX:	Blepharitis   1